# Patient Record
Sex: FEMALE | Race: WHITE | Employment: OTHER | URBAN - METROPOLITAN AREA
[De-identification: names, ages, dates, MRNs, and addresses within clinical notes are randomized per-mention and may not be internally consistent; named-entity substitution may affect disease eponyms.]

---

## 2020-10-04 ENCOUNTER — TRANSFERRED RECORDS (OUTPATIENT)
Dept: HEALTH INFORMATION MANAGEMENT | Facility: CLINIC | Age: 20
End: 2020-10-04

## 2020-10-07 ENCOUNTER — HOSPITAL ENCOUNTER (EMERGENCY)
Facility: CLINIC | Age: 20
Discharge: PSYCHIATRIC OR SUBSTANCE USE FACILITY | End: 2020-10-08
Attending: FAMILY MEDICINE | Admitting: FAMILY MEDICINE
Payer: COMMERCIAL

## 2020-10-07 ENCOUNTER — TELEPHONE (OUTPATIENT)
Dept: BEHAVIORAL HEALTH | Facility: CLINIC | Age: 20
End: 2020-10-07

## 2020-10-07 ENCOUNTER — AMBULATORY - HEALTHEAST (OUTPATIENT)
Dept: BEHAVIORAL HEALTH | Facility: CLINIC | Age: 20
End: 2020-10-07

## 2020-10-07 ENCOUNTER — RECORDS - HEALTHEAST (OUTPATIENT)
Dept: ADMINISTRATIVE | Facility: OTHER | Age: 20
End: 2020-10-07

## 2020-10-07 DIAGNOSIS — F33.9 RECURRENT MAJOR DEPRESSION (H): ICD-10-CM

## 2020-10-07 DIAGNOSIS — F15.288: ICD-10-CM

## 2020-10-07 DIAGNOSIS — Z20.828 EXPOSURE TO SARS-ASSOCIATED CORONAVIRUS: ICD-10-CM

## 2020-10-07 DIAGNOSIS — F32.A DEPRESSION, UNSPECIFIED DEPRESSION TYPE: ICD-10-CM

## 2020-10-07 DIAGNOSIS — F15.10 STIMULANT ABUSE (H): ICD-10-CM

## 2020-10-07 DIAGNOSIS — R45.851 SUICIDAL IDEATION: ICD-10-CM

## 2020-10-07 LAB
AMPHETAMINES UR QL SCN: NEGATIVE
BARBITURATES UR QL: NEGATIVE
BENZODIAZ UR QL: NEGATIVE
CANNABINOIDS UR QL SCN: NEGATIVE
COCAINE UR QL: NEGATIVE
ETHANOL UR QL SCN: NEGATIVE
HCG UR QL: NEGATIVE
OPIATES UR QL SCN: NEGATIVE

## 2020-10-07 PROCEDURE — 99285 EMERGENCY DEPT VISIT HI MDM: CPT | Mod: 25

## 2020-10-07 PROCEDURE — C9803 HOPD COVID-19 SPEC COLLECT: HCPCS

## 2020-10-07 PROCEDURE — 90791 PSYCH DIAGNOSTIC EVALUATION: CPT

## 2020-10-07 PROCEDURE — 99284 EMERGENCY DEPT VISIT MOD MDM: CPT | Performed by: FAMILY MEDICINE

## 2020-10-07 PROCEDURE — 81025 URINE PREGNANCY TEST: CPT | Performed by: FAMILY MEDICINE

## 2020-10-07 PROCEDURE — 80307 DRUG TEST PRSMV CHEM ANLYZR: CPT | Performed by: FAMILY MEDICINE

## 2020-10-07 PROCEDURE — 80320 DRUG SCREEN QUANTALCOHOLS: CPT | Performed by: FAMILY MEDICINE

## 2020-10-07 PROCEDURE — U0003 INFECTIOUS AGENT DETECTION BY NUCLEIC ACID (DNA OR RNA); SEVERE ACUTE RESPIRATORY SYNDROME CORONAVIRUS 2 (SARS-COV-2) (CORONAVIRUS DISEASE [COVID-19]), AMPLIFIED PROBE TECHNIQUE, MAKING USE OF HIGH THROUGHPUT TECHNOLOGIES AS DESCRIBED BY CMS-2020-01-R: HCPCS | Performed by: FAMILY MEDICINE

## 2020-10-07 RX ORDER — VENLAFAXINE 75 MG/1
225 TABLET ORAL DAILY
COMMUNITY

## 2020-10-07 RX ORDER — FERROUS SULFATE 325(65) MG
325 TABLET ORAL
COMMUNITY

## 2020-10-07 RX ORDER — QUETIAPINE 200 MG/1
200 TABLET, FILM COATED, EXTENDED RELEASE ORAL AT BEDTIME
COMMUNITY

## 2020-10-08 VITALS
OXYGEN SATURATION: 98 % | DIASTOLIC BLOOD PRESSURE: 74 MMHG | HEART RATE: 78 BPM | SYSTOLIC BLOOD PRESSURE: 101 MMHG | RESPIRATION RATE: 18 BRPM | WEIGHT: 150 LBS | TEMPERATURE: 97.7 F

## 2020-10-08 LAB
LABORATORY COMMENT REPORT: NORMAL
SARS-COV-2 RNA SPEC QL NAA+PROBE: NEGATIVE
SARS-COV-2 RNA SPEC QL NAA+PROBE: NORMAL
SPECIMEN SOURCE: NORMAL
SPECIMEN SOURCE: NORMAL

## 2020-10-08 NOTE — TELEPHONE ENCOUNTER
Patient cleared and ready for behavioral bed placement: Yes    S Pt is a 20 year old female at the [place] [facility]    B Pt lives in [place] but came to [place] for treatment due to stimulant use at [facility]. Pt has a history of depression. Pt attempted suicide at the age of 16 by cutting. Pt lives in [place] with . Pt says she depression has gotten worse over last week. Pt is suicidal with a plan to use a piece of glass to kill herself. Pt denies aggression and psychosis. Pt is medically cleared. Pt denies covid19 symptoms.     A Vol    R 5500/Lazaro     -collecting covid19 1120pm per ed

## 2020-10-08 NOTE — ED PROVIDER NOTES
[facility] EMERGENCY DEPARTMENT ([facility])   October 7, 2020 BEC 2     History     Chief Complaint   Patient presents with     Suicidal     HPI  Saad Johnson is a 20 year old female with history of depression, PTSD, prior suicide attempt at age 16 and substance use who presents the emergency department for evaluation of suicidal ideation.  Patient is from [place] originally, had came to [place] for treatment at [facility] for stimulant abuse.  Over the past week she has had worsening depression and suicidal ideation, developed a plan to kill herself with a piece of glass.  No aggression, no psychosis.  No COVID-19 symptoms.  She came to the ED voluntarily.  Patient describes overwhelming impulses to kill herself she states that these can last anywhere from just a few minutes to several minutes but they have been coming more frequent and intense.  Patient feels as though she is no longer able to maintain safety and has a specific plan to cut herself.  Patient did some superficial cutting yesterday on her right wrist.  Once again patient was able to reveal to staff at [facility] that in fact she is not able to stay safe they are requesting that she be hospitalized and stabilized prior to returning to [facility].      PAST MEDICAL HISTORY:   Past Medical History:   Diagnosis Date     Anxiety      Depressive disorder      PTSD (post-traumatic stress disorder)     Dad passed away 5 years ago due to cancer.       PAST SURGICAL HISTORY:   Past Surgical History:   Procedure Laterality Date     MAMMOPLASTY REDUCTION BILATERAL Bilateral December 2020       Past medical history, past surgical history, medications, and allergies were reviewed with the patient. Additional pertinent items: None    FAMILY HISTORY: History reviewed. No pertinent family history.    SOCIAL HISTORY:   Social History     Tobacco Use     Smoking status: Never Smoker     Smokeless tobacco: Never Used   Substance Use Topics     Alcohol use: Not  Currently     Comment: Sober for 30 days     Social history was reviewed with the patient. Additional pertinent items: None      Patient's Medications   New Prescriptions    No medications on file   Previous Medications    FERROUS SULFATE (FEROSUL) 325 (65 FE) MG TABLET    Take 325 mg by mouth daily (with breakfast)    QUETIAPINE ER (SEROQUEL XR) 200 MG 24 HR TABLET    Take 200 mg by mouth At Bedtime    VENLAFAXINE (EFFEXOR) 75 MG TABLET    Take 225 mg by mouth daily   Modified Medications    No medications on file   Discontinued Medications    No medications on file        No Known Allergies     Review of Systems  A complete review of systems was performed with pertinent positives and negatives noted in the HPI, and all other systems negative.    Physical Exam   BP: 103/77  Pulse: 78  Temp: 97.7  F (36.5  C)  Resp: 18  SpO2: 98 %      Physical Exam  Constitutional:       General: She is not in acute distress.     Appearance: She is not diaphoretic.   HENT:      Head: Atraumatic.      Mouth/Throat:      Pharynx: No oropharyngeal exudate.   Eyes:      General: No scleral icterus.     Pupils: Pupils are equal, round, and reactive to light.   Cardiovascular:      Heart sounds: Normal heart sounds.   Pulmonary:      Effort: No respiratory distress.      Breath sounds: Normal breath sounds.   Abdominal:      General: Bowel sounds are normal.      Palpations: Abdomen is soft.      Tenderness: There is no abdominal tenderness.   Musculoskeletal:         General: No tenderness.   Skin:     General: Skin is warm.      Findings: No rash.   Neurological:      General: No focal deficit present.      Mental Status: She is oriented to person, place, and time.      Cranial Nerves: No cranial nerve deficit.      Motor: No weakness.      Coordination: Coordination normal.   Psychiatric:         Mood and Affect: Mood is anxious and depressed.         Behavior: Behavior is withdrawn.         Thought Content: Thought content includes  suicidal ideation. Thought content includes suicidal plan.         Judgment: Judgment is impulsive.         ED Course        Procedures           Patient was seen and evaluated by the  please refer to the documentation in the note section of the epic chart dated 10/7/2020         Labs pending at time of dictation.    Assessments & Plan (with Medical Decision Making)           I have reviewed the nursing notes.    I have reviewed the findings, diagnosis, plan and need for follow up with the patient.    Patient with ongoing depression anxiety related somewhat to her current treatment for stimulant abuse.  Now presenting with acute increase in suicidal ideation impulses and will require inpatient psychiatric services in order to stabilize.    Final diagnoses:   Suicidal ideation   Stimulant abuse (H)   Depression, unspecified depression type       10/7/2020   [facility] EMERGENCY DEPARTMENT     Michele Madden MD  10/07/20 4504

## 2020-10-08 NOTE — ED NOTES
Bed: HW03  Expected date:   Expected time:   Means of arrival:   Comments:  [place] [address]  19 F  SI from [facility]

## 2020-10-09 ENCOUNTER — COMMUNICATION - HEALTHEAST (OUTPATIENT)
Dept: SCHEDULING | Facility: CLINIC | Age: 20
End: 2020-10-09

## 2021-06-12 NOTE — PROGRESS NOTES
Patient cleared and ready for behavioral bed placement: Yes     S Pt is a 20 year old female at the [place] [facility]     B Pt lives in [place] but came to [place] for treatment due to stimulant use at [facility]. Pt has a history of depression. Pt attempted suicide at the age of 16 by cutting. Pt lives in [place] with . Pt says she depression has gotten worse over last week. Pt is suicidal with a plan to use a piece of glass to kill herself. Pt denies aggression and psychosis. Pt is medically cleared. Pt denies covid19 symptoms.      A Vol     R 5500/Lazaro      -collecting covid19 1120pm per ed    - 1115PM [facility] accepts for 5500.  - 1150PM Pt placed in queue and unit notified  - 1155PM ED notified of pt placement and unit will take report at 1 AM.

## 2021-06-16 PROBLEM — F33.2 SEVERE RECURRENT MAJOR DEPRESSION WITHOUT PSYCHOTIC FEATURES (H): Chronic | Status: ACTIVE | Noted: 2020-10-08

## 2021-06-16 PROBLEM — F10.20 SEVERE ALCOHOL USE DISORDER (H): Chronic | Status: ACTIVE | Noted: 2020-10-08

## 2021-06-16 PROBLEM — R09.02 HYPOXIA: Status: ACTIVE | Noted: 2020-10-09

## 2021-06-16 PROBLEM — R55 VASOVAGAL SYNCOPE: Status: ACTIVE | Noted: 2020-10-09

## 2021-06-16 PROBLEM — F43.12 CHRONIC POST-TRAUMATIC STRESS DISORDER (PTSD): Chronic | Status: ACTIVE | Noted: 2020-10-08

## 2021-06-16 PROBLEM — F15.20 SEVERE AMPHETAMINE SUBSTANCE USE DISORDER (H): Chronic | Status: ACTIVE | Noted: 2020-10-08

## 2021-06-16 PROBLEM — I95.9 HYPOTENSION, UNSPECIFIED HYPOTENSION TYPE: Status: ACTIVE | Noted: 2020-10-09
